# Patient Record
Sex: FEMALE | Race: WHITE | URBAN - METROPOLITAN AREA
[De-identification: names, ages, dates, MRNs, and addresses within clinical notes are randomized per-mention and may not be internally consistent; named-entity substitution may affect disease eponyms.]

---

## 2018-03-06 VITALS
SYSTOLIC BLOOD PRESSURE: 143 MMHG | RESPIRATION RATE: 16 BRPM | TEMPERATURE: 96.6 F | WEIGHT: 143.3 LBS | DIASTOLIC BLOOD PRESSURE: 68 MMHG | OXYGEN SATURATION: 92 % | HEART RATE: 72 BPM

## 2018-03-06 PROBLEM — Z86.73 HX-TIA (TRANSIENT ISCHEMIC ATTACK): Status: ACTIVE | Noted: 2018-03-06

## 2018-03-06 PROBLEM — M54.40 BACK PAIN OF LUMBAR REGION WITH SCIATICA: Status: ACTIVE | Noted: 2018-02-21

## 2018-03-06 PROBLEM — E78.5 HYPERLIPEMIA: Status: ACTIVE | Noted: 2018-03-06

## 2018-03-06 PROBLEM — I10 HTN (HYPERTENSION): Status: ACTIVE | Noted: 2018-02-21

## 2018-03-06 PROBLEM — Z98.1 HX OF SPINAL FUSION: Status: ACTIVE | Noted: 2018-03-06

## 2018-03-06 PROBLEM — Z87.81 HX OF FRACTURE OF RIGHT HIP: Status: ACTIVE | Noted: 2017-05-26

## 2018-03-06 PROBLEM — G47.00 INSOMNIA: Status: ACTIVE | Noted: 2017-05-26

## 2018-03-06 PROBLEM — E03.9 HYPOTHYROID: Status: ACTIVE | Noted: 2018-03-06

## 2018-03-06 PROBLEM — I73.9 PAD (PERIPHERAL ARTERY DISEASE) (H): Status: ACTIVE | Noted: 2018-03-06

## 2018-03-06 RX ORDER — CALCIUM CARBONATE 500(1250)
TABLET,CHEWABLE ORAL
COMMUNITY
Start: 2018-03-05

## 2018-03-06 RX ORDER — ACETAMINOPHEN 500 MG
1000 TABLET ORAL 4 TIMES DAILY
COMMUNITY
Start: 2018-03-05

## 2018-03-06 RX ORDER — NIACIN 500 MG
1000 TABLET ORAL
COMMUNITY
Start: 2018-03-05

## 2018-03-06 RX ORDER — DOCUSATE SODIUM 100 MG/1
100 CAPSULE, LIQUID FILLED ORAL 2 TIMES DAILY
COMMUNITY
Start: 2018-03-05

## 2018-03-06 RX ORDER — DOCUSATE SODIUM 100 MG
CAPSULE ORAL
COMMUNITY

## 2018-03-06 RX ORDER — MAGNESIUM OXIDE/MAG AA CHELATE 300 MG
CAPSULE ORAL
COMMUNITY
End: 2018-03-28

## 2018-03-06 RX ORDER — AMOXICILLIN 250 MG
2 CAPSULE ORAL 2 TIMES DAILY PRN
COMMUNITY
Start: 2018-03-05

## 2018-03-06 RX ORDER — POLYETHYLENE GLYCOL 3350 17 G/17G
17 POWDER, FOR SOLUTION ORAL DAILY
COMMUNITY
Start: 2018-03-05

## 2018-03-06 NOTE — PROGRESS NOTES
"[facility]  PRIMARY CARE PROVIDER AND CLINIC:  Luisana Roy [address] / [place] [address]  Chief Complaint   Patient presents with     Clinic Care Coordination - Initial       HPI:    Evonne Puri is a 86 year old  (7/10/1931),admitted to the [facility] from [facility] .  Hospital stay 2/26/18  through 3/5/18.  Admitted to this facility for  rehab, medical management and nursing care.  HPI information obtained from: facility chart records, facility staff and patient report.   Patient with PMH of hypertension, PVD, hypothyroidism, hyperlipidemia, right ALMA DELIA (07), lumbar fusion (04) and bilateral vascular stents in her legs.     Current issues are:      Acute bilateral low back pain with bilateral sciatica  Patient very frustrated with inability to find root cause and therefore manage pain  3 weeks ago experienced sudden onset of pain at night.  Unable to have MRI due to uncertainty of what material her stents are made of  Has been to ED and hospitalized twice in past 2 weeks and hospitalized for increasing LBP with sciatica associated with some weakness.  CT Lumbar showed chronic DJD  Neurosurgery consulted, mild L5 weakness and Carpal tunnel felt nothing surgically to be done.  Has wrist braces  Had steroid injections into SI joints--does not feel that has helped much.  On scheduled Tylenol, Neurontin, robaxin, PRN oxycodone, hydroxyzine  Pain is never completely controlled.  Neurontin increased in hospital without drowsiness S/E.  Also has generalized back pain, including in shoulder area.  Exacerbated by extension, abduction and rotation of arms--feels \"electric zingers\" shooting down her arms with when reaches a certain point with bilateral UE. Unable to  items without difficulty and sometimes dropping them    Vitamin B12 deficiency  Has had tingling in hands and feet as well as tongue, mouth.  B12 low.  Started on B12 injections.  To " have weekly, but they are not included in TCU orders.    PAD (peripheral artery disease) (H)/PVD  Has had 2 stents placed in BLE  Numb hands and feet    Essential hypertension  Chronic, controlled with Benazepril 10 , metoprolol 50    Hx-TIA (transient ischemic attack)  On Plavix. Denies any residual    Hypothyroidism, unspecified type  Chronic, stable on levothryoxine 100 mcg    Hyperlipidemia, unspecified hyperlipidemia type  Chronic and stable  On Rosuvastatin 40mg, niacin, ASA 81mg    Constipation, unspecified constipation type  Was constipated in hospital (?2/2 narcotics), received laxatives, enema, then had diarrhea.  Now more constipated.  On stool softener and Miralax.  Does not want to add anything further at this time   has not had much of an appetite, due to pain. Has had a 14# weight loss. Prefers lactose free diet, but does take pudding and ice cream supplements.    Bilateral Carpal Tunnel Syndrome  Dx by neuro in hospital.  Has splints.  However, unsure of dx.    Patient does not trust conclusions of neurologist  Tinel's sign and Phalen's test negative for pain    Leukocytosis  Mentioned in history, trending up, may have indolent CLL          CODE STATUS/ADVANCE DIRECTIVES DISCUSSION:   DNR / DNI  Patient's living condition: lives alone    ALLERGIES:Morphine and Oxycodone-acetaminophen  Surgical History    Surgery Date Laterality Comments   LUMBAR FUSION 2009       REPLACEMENT TOTAL HIP W/ RESURFACING IMPLANTS 2009 Right     CATARACT EXTRACTION   Bilateral     MOHS SURGERY   Left BCC   Medical History    Medical History Date Comments   History of Bell's palsy       Hx of fracture of foot 2017 Rt   Hypothyroid       PAD (peripheral artery disease) (HC)       Hx-TIA (transient ischemic attack)       Insomnia       Hx of smoking   30 yrs, quit 40 yr ago   Hx of spinal fusion       Hyperlipemia       Hx of basal cell carcinoma excision   Lt eye lid   Family History    Medical History Relation Name Comments    No Known Problems Father       No Known Problems Mother         Relation Name Status Comments   Father         Mother             SOCIAL HISTORY:  reports that she has quit smoking. She does not have any smokeless tobacco history on file.       Post Discharge Medication Reconciliation Status: discharge medications reconciled and changed, per note/orders (see AVS).  Current Outpatient Prescriptions   Medication Sig Dispense Refill     GABAPENTIN PO Take 200 mg by mouth daily And 100mg BID       Benazepril HCl (LOTENSIN PO) Take 10 mg by mouth daily       Magnesium 300 MG CAPS Give 900 mg by mouth one time a  day for Supplement       METOPROLOL SUCCINATE ER PO Take 50 mg by mouth daily       polyethylene glycol (MIRALAX/GLYCOLAX) powder Take 17 g by mouth daily       niacin 500 MG tablet Take 1,000 mg by mouth daily (with breakfast)       calcium carbonate 1250 (500 CA) MG CHEW Give 1 tablet by mouth one time a  day for Supplement       docusate sodium (COLACE) 100 MG capsule Take 100 mg by mouth 2 times daily       Carboxymethylcellulose Sod PF (REFRESH CELLUVISC) 1 % ophthalmic solution Place 1 drop into both eyes 2 times daily       VITAMIN E SKIN OIL Apply to Affected area on arms  topically two times a day for       acetaminophen (TYLENOL) 500 MG tablet Take 1,000 mg by mouth 4 times daily       HYDROXYZINE HCL PO Take 10 mg by mouth every 6 hours as needed for itching       OXYCODONE HCL PO Take 5 mg by mouth every 4 hours as needed       pramox-pe-glycerin-petrolatum (PREPARATION H) 1-0.25-14.4-15 % CREA cream Insert 1 application rectally as  needed for Hemorrhoids May have  4 times daily       Methocarbamol (ROBAXIN-750 PO) Take 750 mg by mouth every 6 hours as needed for muscle spasms       senna-docusate (SENOKOT-S;PERICOLACE) 8.6-50 MG per tablet Take 2 tablets by mouth 2 times daily as needed for constipation       levothyroxine (SYNTHROID) 100 MCG tablet Take 100 mcg by mouth daily       rosuvastatin  (CRESTOR) 40 MG tablet Take 40 mg by mouth daily       aspirin 81 MG tablet Take 81 mg by mouth daily       clopidogrel (PLAVIX) 75 MG tablet Take 75 mg by mouth daily         ROS:  10 point ROS of systems including Constitutional, Eyes, Respiratory, Cardiovascular, Gastroenterology, Genitourinary, Integumentary, Muscularskeletal, Psychiatric were all negative except for pertinent positives noted in my HPI.    Exam:  /68  Pulse 72  Temp 96.6  F (35.9  C)  Resp 16  Wt 143 lb 4.8 oz (65 kg)  SpO2 92%  GENERAL APPEARANCE:  Alert, oriented, anxious, cooperative  ENT:  Mouth and posterior oropharynx normal, moist mucous membranes  RESP:  lungs clear to auscultation , no respiratory distress  CV:  Palpation and auscultation of heart done , regular rate and rhythm, no murmur, rub, or gallop, peripheral edema trace+ in BLE  ABDOMEN:  normal bowel sounds, soft, nontender, no hepatosplenomegaly or other masses  M/S:   Gait and station abnormal w/c or walker with SBA due to numbness/tingling/uncertainty  SKIN:  no concerning lesions noted  NEURO:   DTRs WNL, strength equal, pain elicited with UE extension, abduction, Tinhel's and Phalen's negative  PSYCH:  oriented X 3, normal insight, judgement and memory, insight and judgement impaired, anxious  Unsure what baseline strength is    Lab/Diagnostic data:    CBC WITH AUTO DIFFERENTIAL (03/02/2018 9:26 AM)  Only the most recent of 3 results within the time period is included.    CBC WITH AUTO DIFFERENTIAL (03/02/2018 9:26 AM)   Component Value Ref Range   WHITE BLOOD COUNT  14.4 (H) 4.5 - 11.0 thou/cu mm   RED BLOOD COUNT  4.80 4.00 - 5.20 mil/cu mm   HEMOGLOBIN  15.5 12.0 - 16.0 g/dL   HEMATOCRIT  44.6 33.0 - 51.0 %   MCV  93 80 - 100 fL   MCH  32.3 26.0 - 34.0 pg   MCHC  34.8 32.0 - 36.0 g/dL   RDW  14.2 11.5 - 15.5 %   PLATELET COUNT  266 140 - 440 thou/cu mm   MPV  10.8 6.5 - 11.0 fL   NEUTROPHILS  74.4 (H) 42.0 - 72.0 %   LYMPHOCYTES  16.7 (L) 20.0 - 44.0 %    MONOCYTES  6.9 <12.0 %   EOSINOPHILS  0.9 <8.0 %   BASOPHILS  0.2 <3.0 %   IMMATURE GRANULOCYTES(METAS,MYELOS,PROS) 0.9 %   ABSOLUTE NEUTROPHILS  10.7 (H) 1.7 - 7.0 thou/cu mm   ABSOLUTE LYMPHOCYTES  2.4 0.9 - 2.9 thou/cu mm   ABSOLUTE MONOCYTES  1.0 (H) <0.9 thou/cu mm   ABSOLUTE EOSINOPHILS  0.1 <0.5 thou/cu mm   ABSOLUTE BASOPHILS  0.0 <0.3 thou/cu mm   ABSOLUTE IMMATURE GRANULOCYTES(METAS,MYELOS,PROS) 0.1 <0.3 thou/cu mm     CBC WITH AUTO DIFFERENTIAL (03/02/2018 9:26 AM)   Specimen Performing Laboratory   Blood [facility] LABORATORY    INTERNAL ZIP [id number]    [address] [place] [address]       BASIC METABOLIC PANEL (03/02/2018 9:26 AM)  Only the most recent of 3 results within the time period is included.    BASIC METABOLIC PANEL (03/02/2018 9:26 AM)   Component Value Ref Range   SODIUM 132 (L) 135 - 145 mmol/L   POTASSIUM 4.1 3.5 - 5.0 mmol/L   CHLORIDE 97 (L) 98 - 110 mmol/L   CO2,TOTAL 26 21 - 31 mmol/L   ANION GAP 9 5 - 18    GLUCOSE 164 (H) 65 - 100 mg/dL   CALCIUM 8.6 8.5 - 10.5 mg/dL   BUN 13 8 - 25 mg/dL   CREATININE 0.75 0.57 - 1.11 mg/dL   BUN/CREAT RATIO  17 10 - 20    GFR if African American >60 >60 ml/min/1.73m2   GFR if not African American >60 >60 ml/min/1.73m2     BASIC METABOLIC PANEL (03/02/2018 9:26 AM)   Specimen Performing Laboratory   Blood [facility] LABORATORY    INTERNAL ZIP [id number]    [address] [place] [address]     HEPATIC FUNCTION PANEL (02/28/2018 1:14 PM)  HEPATIC FUNCTION PANEL (02/28/2018 1:14 PM)   Component Value Ref Range   ALBUMIN 3.4 3.2 - 4.6 g/dL   PROTEIN,TOTAL 6.2 6.0 - 8.0 g/dL   GLOBULIN  2.8 2.0 - 3.7 g/dL   A/G RATIO 1.2 1.0 - 2.0    BILIRUBIN,TOTAL 0.6 0.2 - 1.2 mg/dL   BILIRUBIN,DIRECT 0.3 0.1 - 0.5 mg/dL   BILIRUBIN,INDIRECT  0.3 0.2 - 0.8 mg/dL   ALK PHOSPHATASE 69 50 - 136 IU/L   ALT (SGPT) 62 (H) 8 - 45 IU/L   AST (SGOT) 19 2 - 40 IU/L     HEPATIC FUNCTION PANEL (02/28/2018 1:14 PM)   Specimen Performing Laboratory    Blood [facility] LABORATORY    INTERNAL ZIP [id number]    [address] [place] [address]       UA - Urinalysis with Reflex Micro (02/26/2018 2:29 PM)  UA - Urinalysis with Reflex Micro (02/26/2018 2:29 PM)   Component Value Ref Range   COLOR  Yellow Yellow Color   CLARITY  Clear Clear Clarity   SPECIFIC GRAVITY,URINE  1.025 1.010, 1.015, 1.020, 1.025    PH,URINE  6.5 6.0, 7.0, 8.0, 5.5, 6.5, 7.5, 8.5    UROBILINOGEN,QUALITATIVE  Normal Normal EU/dl   PROTEIN, URINE Trace (A) Negative mg/dL   GLUCOSE, URINE Negative Negative mg/dL   KETONES,URINE  Negative Negative mg/dL   BILIRUBIN,URINE  Negative Negative    OCCULT BLOOD,URINE  Negative Negative    NITRITE  Negative Negative    LEUKOCYTE ESTERASE  Negative Negative      UA - Urinalysis with Reflex Micro (02/26/2018 2:29 PM)   Specimen Performing Laboratory   Urine [facility] LABORATORY    INTERNAL ZIP [id number]    [address] [place] [address]     Back to top of Results        ASSESSMENT/PLAN:  Acute bilateral low back pain with bilateral sciatica  Continue with current PO med regime  Rehab to start.  Consider ultrasound, heat, cold, ROM, splints  Will increase Neurontin   May need more work up    Vitamin B12 deficiency  Will add B12.  Has been 1 week since last injection    PAD (peripheral artery disease) (H)  Essential hypertension  Hx-TIA (transient ischemic attack)  Hypothyroidism, unspecified type  Hyperlipidemia, unspecified hyperlipidemia type  Continue with PTA meds.  Continue to monitor and adjust as necessary    Constipation, unspecified constipation type  Continue with current regime.  Monitor closely and adjust as necessary    Leukocytosis, unspecified type  Will need to f/u OP       Orders:  Give Vitamin B12 1,000 mcg injections weekly for 3 weeks then change to monthly  Increase Hydroxyzine frequency to every 4 hours; Encourage use with Oxycodone  Increase Gabapentin dose to 200mg in am, 100mg mid day, 300mg at  HS  Offer pudding and ice cream snacks daily    Total time spent with patient visit at the skilled nursing facility was >35 minutes including patient visit and review of past records. Greater than 50% of total time spent with counseling and coordinating care due to complex pain complaints, admission    Electronically signed by:  BERNARDINO Rosario CNP

## 2018-03-07 ENCOUNTER — NURSING HOME VISIT (OUTPATIENT)
Dept: GERIATRICS | Facility: CLINIC | Age: 83
End: 2018-03-07
Payer: COMMERCIAL

## 2018-03-07 DIAGNOSIS — I73.9 PAD (PERIPHERAL ARTERY DISEASE) (H): ICD-10-CM

## 2018-03-07 DIAGNOSIS — M54.41 ACUTE BILATERAL LOW BACK PAIN WITH BILATERAL SCIATICA: Primary | ICD-10-CM

## 2018-03-07 DIAGNOSIS — K59.00 CONSTIPATION, UNSPECIFIED CONSTIPATION TYPE: ICD-10-CM

## 2018-03-07 DIAGNOSIS — E53.8 VITAMIN B12 DEFICIENCY: ICD-10-CM

## 2018-03-07 DIAGNOSIS — I10 ESSENTIAL HYPERTENSION: ICD-10-CM

## 2018-03-07 DIAGNOSIS — G56.03 BILATERAL CARPAL TUNNEL SYNDROME: ICD-10-CM

## 2018-03-07 DIAGNOSIS — E03.9 HYPOTHYROIDISM, UNSPECIFIED TYPE: ICD-10-CM

## 2018-03-07 DIAGNOSIS — Z86.73 HX-TIA (TRANSIENT ISCHEMIC ATTACK): ICD-10-CM

## 2018-03-07 DIAGNOSIS — E78.5 HYPERLIPIDEMIA, UNSPECIFIED HYPERLIPIDEMIA TYPE: ICD-10-CM

## 2018-03-07 DIAGNOSIS — M54.42 ACUTE BILATERAL LOW BACK PAIN WITH BILATERAL SCIATICA: Primary | ICD-10-CM

## 2018-03-07 DIAGNOSIS — D72.829 LEUKOCYTOSIS, UNSPECIFIED TYPE: ICD-10-CM

## 2018-03-07 PROCEDURE — 99310 SBSQ NF CARE HIGH MDM 45: CPT | Performed by: NURSE PRACTITIONER

## 2018-03-12 VITALS
DIASTOLIC BLOOD PRESSURE: 90 MMHG | TEMPERATURE: 97.5 F | RESPIRATION RATE: 18 BRPM | SYSTOLIC BLOOD PRESSURE: 160 MMHG | WEIGHT: 146.4 LBS | HEART RATE: 99 BPM | OXYGEN SATURATION: 94 %

## 2018-03-12 NOTE — PROGRESS NOTES
[facility]  PRIMARY CARE PROVIDER AND CLINIC:  [facility] [address] [facility] / [facility] [address]  Chief Complaint   Patient presents with     Hospital F/U   HPI:    Evonne Puri is a 86 year old  (7/10/1931),admitted to the [facility] from [facility] .  Hospital stay 2/26/18  through 3/5/18.  Admitted to this rehab, medical management and nursing care.  HPI information obtained from: facility staff, patient report and [facility] chart review.      Past medical history significant for right hip replacement, lumbar fusion of bilateral vascular stenting in legs, with multiple admission to local hospital for weakness and LBP, CT lumber showed Ch DJD, NS consulted, no surgery noticed some mild L5 weakness but no surgery, also b/l CTS provided with braces; given B/L SIJ steroid injection, also seen by pain team, pain med adjusted. complained of  paresthesias - vit B12 came back low, started on supplementation started.     Patient was seen and examined today, reports that weeks prior to the initial admission to the hospital she had upper respiratory infection to antibiotic later developed as numbness and tingling in her feet hands and in the mouth, also started having worsening back pain.  Reports that the numbness tingling are still seen in her hands and feet and around the mouth.  Reports when she abducts her right shoulder she developed shooting pain down to the hand she feels that she is making some improvement with therapy as for a general.  Strength is concerned but no change in neuropathy.  Reportedly injection she had the hospital lower back did not change her numbness and tingling.  Reports that the therapist is working on the tight muscle on the right shoulder.  The lower back pains there but is better.  Major concern is the numbness and tingling    CODE STATUS/ADVANCE DIRECTIVES DISCUSSION:   DNR / DNI  Patient's living condition:  lives alone    ALLERGIES:Morphine and Oxycodone-acetaminophen  Surgical History    Surgery Date Laterality Comments   LUMBAR FUSION 2009         REPLACEMENT TOTAL HIP W/ RESURFACING IMPLANTS 2009 Right      CATARACT EXTRACTION    Bilateral      MOHS SURGERY    Left BCC   Medical History    Medical History Date Comments   History of Bell's palsy         Hx of fracture of foot 2017 Rt   Hypothyroid         PAD (peripheral artery disease) (HC)         Hx-TIA (transient ischemic attack)         Insomnia         Hx of smoking    30 yrs, quit 40 yr ago   Hx of spinal fusion         Hyperlipemia         Hx of basal cell carcinoma excision    Lt eye lid   Family History    Medical History Relation Name Comments   No Known Problems Father         No Known Problems Mother            Relation Name Status Comments   Father            Mother            SOCIAL HISTORY:  reports that she has quit smoking. She does not have any smokeless tobacco history on file.    Post Discharge Medication Reconciliation Status: discharge medications reconciled and changed, per note/orders (see AVS).  Current Outpatient Prescriptions   Medication Sig Dispense Refill     GABAPENTIN PO Take 200 mg by mouth daily And 100mg BID       Benazepril HCl (LOTENSIN PO) Take 10 mg by mouth daily       Magnesium 300 MG CAPS Give 900 mg by mouth one time a  day for Supplement       METOPROLOL SUCCINATE ER PO Take 50 mg by mouth daily       polyethylene glycol (MIRALAX/GLYCOLAX) powder Take 17 g by mouth daily       niacin 500 MG tablet Take 1,000 mg by mouth daily (with breakfast)       calcium carbonate 1250 (500 CA) MG CHEW Give 1 tablet by mouth one time a  day for Supplement       docusate sodium (COLACE) 100 MG capsule Take 100 mg by mouth 2 times daily       Carboxymethylcellulose Sod PF (REFRESH CELLUVISC) 1 % ophthalmic solution Place 1 drop into both eyes 2 times daily       VITAMIN E SKIN OIL Apply to Affected area on arms  topically two times a day  for       acetaminophen (TYLENOL) 500 MG tablet Take 1,000 mg by mouth 4 times daily       HYDROXYZINE HCL PO Take 10 mg by mouth every 6 hours as needed for itching       OXYCODONE HCL PO Take 5 mg by mouth every 4 hours as needed       pramox-pe-glycerin-petrolatum (PREPARATION H) 1-0.25-14.4-15 % CREA cream Insert 1 application rectally as  needed for Hemorrhoids May have  4 times daily       Methocarbamol (ROBAXIN-750 PO) Take 750 mg by mouth every 6 hours as needed for muscle spasms       senna-docusate (SENOKOT-S;PERICOLACE) 8.6-50 MG per tablet Take 2 tablets by mouth 2 times daily as needed for constipation       levothyroxine (SYNTHROID) 100 MCG tablet Take 100 mcg by mouth daily       rosuvastatin (CRESTOR) 40 MG tablet Take 40 mg by mouth daily       aspirin 81 MG tablet Take 81 mg by mouth daily       clopidogrel (PLAVIX) 75 MG tablet Take 75 mg by mouth daily         ROS:  10 point ROS of systems including Constitutional, Eyes, Respiratory, Cardiovascular, Gastroenterology, Genitourinary, Integumentary, Muscularskeletal, Psychiatric were all negative except for pertinent positives noted in my HPI.    Exam:  /90  Pulse 99  Temp 97.5  F (36.4  C)  Resp 18  Wt 146 lb 6.4 oz (66.4 kg)  SpO2 94%  GENERAL APPEARANCE:  in no distress, anxious  ENT:  Mouth and posterior oropharynx normal, moist mucous membranes  EYES:  EOM, conjunctivae, lids, pupils and irises normal  RESP:  respiratory effort and palpation of chest normal, lungs clear to auscultation , no respiratory distress  CV:  Palpation and auscultation of heart done , regular rate and rhythm, no murmur, rub, or gallop, no edema  ABDOMEN:  normal bowel sounds, soft, nontender, no hepatosplenomegaly or other masses  M/S:   Gait and station abnormal uses 4WW. lost some muscle mass in extremities. right shoulder ARM limited to 90 degree due to pain.  Right supraspinatus muscles and right trap  harder than left side.   SKIN:  thin and dry, dry  ecchymosis over hands and forearms healed surgical scar over lumbar area.   NEURO:   Cranial nerves 2-12 are normal tested and grossly at patient's baseline, DTR: diminished in ankle, knee, biceps and triceps. Ms strength: hand  4/5 b/l. Sensation to light touch diminished over hands (all territory) and fee (mainly over distal half of plantar surfaces)..Tinel test positive b/l. More on the left side.   PSYCH:  oriented X 3, affect and mood normal    Lab/Diagnostic data:    CBC WITH AUTO DIFFERENTIAL (03/02/2018 9:26 AM)  Only the most recent of 3 results within the time period is included.         CBC WITH AUTO DIFFERENTIAL (03/02/2018 9:26 AM)   Component Value Ref Range   WHITE BLOOD COUNT  14.4 (H) 4.5 - 11.0 thou/cu mm   RED BLOOD COUNT  4.80 4.00 - 5.20 mil/cu mm   HEMOGLOBIN  15.5 12.0 - 16.0 g/dL   HEMATOCRIT  44.6 33.0 - 51.0 %   MCV  93 80 - 100 fL   MCH  32.3 26.0 - 34.0 pg   MCHC  34.8 32.0 - 36.0 g/dL   RDW  14.2 11.5 - 15.5 %   PLATELET COUNT  266 140 - 440 thou/cu mm   MPV  10.8 6.5 - 11.0 fL   NEUTROPHILS  74.4 (H) 42.0 - 72.0 %   LYMPHOCYTES  16.7 (L) 20.0 - 44.0 %   MONOCYTES  6.9 <12.0 %   EOSINOPHILS  0.9 <8.0 %   BASOPHILS  0.2 <3.0 %   IMMATURE GRANULOCYTES(METAS,MYELOS,PROS) 0.9 %   ABSOLUTE NEUTROPHILS  10.7 (H) 1.7 - 7.0 thou/cu mm   ABSOLUTE LYMPHOCYTES  2.4 0.9 - 2.9 thou/cu mm   ABSOLUTE MONOCYTES  1.0 (H) <0.9 thou/cu mm   ABSOLUTE EOSINOPHILS  0.1 <0.5 thou/cu mm   ABSOLUTE BASOPHILS  0.0 <0.3 thou/cu mm   ABSOLUTE IMMATURE GRANULOCYTES(METAS,MYELOS,PROS) 0.1 <0.3 thou/cu mm          CBC WITH AUTO DIFFERENTIAL (03/02/2018 9:26 AM)   Specimen Performing Laboratory   Blood [facility] LABORATORY    INTERNAL ZIP [id number] [address] [facility] [place] [address]        BASIC METABOLIC PANEL (03/02/2018 9:26 AM)  Only the most recent of 3 results within the time period is included.         BASIC METABOLIC PANEL (03/02/2018 9:26 AM)   Component Value Ref Range   SODIUM 132 (L) 135  - 145 mmol/L   POTASSIUM 4.1 3.5 - 5.0 mmol/L   CHLORIDE 97 (L) 98 - 110 mmol/L   CO2,TOTAL 26 21 - 31 mmol/L   ANION GAP 9 5 - 18    GLUCOSE 164 (H) 65 - 100 mg/dL   CALCIUM 8.6 8.5 - 10.5 mg/dL   BUN 13 8 - 25 mg/dL   CREATININE 0.75 0.57 - 1.11 mg/dL   BUN/CREAT RATIO  17 10 - 20    GFR if African American >60 >60 ml/min/1.73m2   GFR if not African American >60 >60 ml/min/1.73m2          BASIC METABOLIC PANEL (03/02/2018 9:26 AM)   Specimen Performing Laboratory   Blood [facility] LABORATORY    INTERNAL ZIP [id number] [address] [place] [address]      HEPATIC FUNCTION PANEL (02/28/2018 1:14 PM)       HEPATIC FUNCTION PANEL (02/28/2018 1:14 PM)   Component Value Ref Range   ALBUMIN 3.4 3.2 - 4.6 g/dL   PROTEIN,TOTAL 6.2 6.0 - 8.0 g/dL   GLOBULIN  2.8 2.0 - 3.7 g/dL   A/G RATIO 1.2 1.0 - 2.0    BILIRUBIN,TOTAL 0.6 0.2 - 1.2 mg/dL   BILIRUBIN,DIRECT 0.3 0.1 - 0.5 mg/dL   BILIRUBIN,INDIRECT  0.3 0.2 - 0.8 mg/dL   ALK PHOSPHATASE 69 50 - 136 IU/L   ALT (SGPT) 62 (H) 8 - 45 IU/L   AST (SGOT) 19 2 - 40 IU/L          HEPATIC FUNCTION PANEL (02/28/2018 1:14 PM)   Specimen Performing Laboratory   Blood [facility] LABORATORY    INTERNAL ZIP [id number]    [address] [place] [address]        UA - Urinalysis with Reflex Micro (02/26/2018 2:29 PM)       UA - Urinalysis with Reflex Micro (02/26/2018 2:29 PM)   Component Value Ref Range   COLOR  Yellow Yellow Color   CLARITY  Clear Clear Clarity   SPECIFIC GRAVITY,URINE  1.025 1.010, 1.015, 1.020, 1.025    PH,URINE  6.5 6.0, 7.0, 8.0, 5.5, 6.5, 7.5, 8.5    UROBILINOGEN,QUALITATIVE  Normal Normal EU/dl   PROTEIN, URINE Trace (A) Negative mg/dL   GLUCOSE, URINE Negative Negative mg/dL   KETONES,URINE  Negative Negative mg/dL   BILIRUBIN,URINE  Negative Negative    OCCULT BLOOD,URINE  Negative Negative    NITRITE  Negative Negative    LEUKOCYTE ESTERASE  Negative Negative           UA - Urinalysis with Reflex Micro (02/26/2018 2:29 PM)   Specimen  Performing Laboratory   Urine [facility] LABORATORY    INTERNAL ZIP [id number] [address] [place] [address]           ASSESSMENT/PLAN:  Acute on chronic bilateral low back pain with bilateral sciatica:  - chronic DJD, diagnosed with b/l SIJ, had injections. On oxycodone, and methocarbamol.  Does not use Oxy as often out of fear to get addicted.   - started on therapy and making some progress as far strength is concerned.     Peripheral Neuropathy:  - affecting both hands and feet, relatively new and sudden in onset, noted after had abx for RTI.  Was found to have low B12 started on supplement. Given the short term of symptoms, will request EMG/NCS to define the type of the peripheral neuropathy.   - continue B12, and Neurontin.   - not improving, will increase Neurontin    Bilateral Carpal Tunnel Syndrome  - diagnosed by NS during hospitalization. May need an EMG/NSC to be done by neurology/PMR on outpatient basis if does not improve to diagnose the type of neuropathy    Chronic Issues:  ------------------------  - PAD (peripheral artery disease) (H)/PVD  - hx of 2 stents placed in BLE     Essential hypertension:  - Chronic, controlled with Benazepril 10 , metoprolol 50     Hx-TIA (transient ischemic attack)  On Plavix. No concern     Hypothyroidism, unspecified type  -   TSH   Date Value Ref Range Status   08/10/2016 0.87 0.40 - 4.00 mU/L Final   - in this age group keep TSH around 5  - Chronic, on levothyroxine 100 mcg. PCP may consider checking level once acute illness is subsided and manage accordingly.      Hyperlipidemia, unspecified hyperlipidemia type  - On Rosuvastatin 40mg, niacin, ASA 81mg    Orders:  1. Referral to neurology for EMG/NCS for peripheral neuropathy, new onset  2.  Increase Neurontin AM dose to 300 mg , Noon dose to 100mg and PM dose to 100mg  3.  Monitor for fall    Electronically signed by:  Hang Matute MD

## 2018-03-13 ENCOUNTER — NURSING HOME VISIT (OUTPATIENT)
Dept: GERIATRICS | Facility: CLINIC | Age: 83
End: 2018-03-13
Payer: COMMERCIAL

## 2018-03-13 DIAGNOSIS — I73.9 PAD (PERIPHERAL ARTERY DISEASE) (H): ICD-10-CM

## 2018-03-13 DIAGNOSIS — M54.41 ACUTE BILATERAL LOW BACK PAIN WITH BILATERAL SCIATICA: Primary | ICD-10-CM

## 2018-03-13 DIAGNOSIS — I10 ESSENTIAL HYPERTENSION: ICD-10-CM

## 2018-03-13 DIAGNOSIS — G61.82 MULTIFOCAL MOTOR NEUROPATHY (H): ICD-10-CM

## 2018-03-13 DIAGNOSIS — E03.9 HYPOTHYROIDISM, UNSPECIFIED TYPE: ICD-10-CM

## 2018-03-13 DIAGNOSIS — G56.03 BILATERAL CARPAL TUNNEL SYNDROME: ICD-10-CM

## 2018-03-13 DIAGNOSIS — M54.42 ACUTE BILATERAL LOW BACK PAIN WITH BILATERAL SCIATICA: Primary | ICD-10-CM

## 2018-03-13 DIAGNOSIS — E53.8 VITAMIN B12 DEFICIENCY: ICD-10-CM

## 2018-03-13 PROCEDURE — 99305 1ST NF CARE MODERATE MDM 35: CPT | Performed by: FAMILY MEDICINE

## 2018-03-13 NOTE — LETTER
3/13/2018        RE: Evonne Puri  [address]  [address]  [place] [address]        [facility] SERVICES  PRIMARY CARE PROVIDER AND CLINIC:  [facility] [address] [facility] [address]  Chief Complaint   Patient presents with     Hospital F/U   HPI:    Evonne Puri is a 86 year old  (7/10/1931),admitted to the [facility] from [facility] .  Hospital stay 2/26/18  through 3/5/18.  Admitted to this rehab, medical management and nursing care.  HPI information obtained from: facility staff, patient report and [facility] chart review.      Past medical history significant for right hip replacement, lumbar fusion of bilateral vascular stenting in legs, with multiple admission to local hospital for weakness and LBP, CT lumber showed Ch DJD, NS consulted, no surgery noticed some mild L5 weakness but no surgery, also b/l CTS provided with braces; given B/L SIJ steroid injection, also seen by pain team, pain med adjusted. complained of  paresthesias - vit B12 came back low, started on supplementation started.     Patient was seen and examined today, reports that weeks prior to the initial admission to the hospital she had upper respiratory infection to antibiotic later developed as numbness and tingling in her feet hands and in the mouth, also started having worsening back pain.  Reports that the numbness tingling are still seen in her hands and feet and around the mouth.  Reports when she abducts her right shoulder she developed shooting pain down to the hand she feels that she is making some improvement with therapy as for a general.  Strength is concerned but no change in neuropathy.  Reportedly injection she had the hospital lower back did not change her numbness and tingling.  Reports that the therapist is working on the tight muscle on the right shoulder.  The lower back pains there but is better.  Major concern is the  numbness and tingling    CODE STATUS/ADVANCE DIRECTIVES DISCUSSION:   DNR / DNI  Patient's living condition: lives alone    ALLERGIES:Morphine and Oxycodone-acetaminophen  Surgical History    Surgery Date Laterality Comments   LUMBAR FUSION 2009         REPLACEMENT TOTAL HIP W/ RESURFACING IMPLANTS 2009 Right      CATARACT EXTRACTION    Bilateral      MOHS SURGERY    Left BCC   Medical History    Medical History Date Comments   History of Bell's palsy         Hx of fracture of foot 2017 Rt   Hypothyroid         PAD (peripheral artery disease) (HC)         Hx-TIA (transient ischemic attack)         Insomnia         Hx of smoking    30 yrs, quit 40 yr ago   Hx of spinal fusion         Hyperlipemia         Hx of basal cell carcinoma excision    Lt eye lid   Family History    Medical History Relation Name Comments   No Known Problems Father         No Known Problems Mother            Relation Name Status Comments   Father            Mother            SOCIAL HISTORY:  reports that she has quit smoking. She does not have any smokeless tobacco history on file.    Post Discharge Medication Reconciliation Status: discharge medications reconciled and changed, per note/orders (see AVS).  Current Outpatient Prescriptions   Medication Sig Dispense Refill     GABAPENTIN PO Take 200 mg by mouth daily And 100mg BID       Benazepril HCl (LOTENSIN PO) Take 10 mg by mouth daily       Magnesium 300 MG CAPS Give 900 mg by mouth one time a  day for Supplement       METOPROLOL SUCCINATE ER PO Take 50 mg by mouth daily       polyethylene glycol (MIRALAX/GLYCOLAX) powder Take 17 g by mouth daily       niacin 500 MG tablet Take 1,000 mg by mouth daily (with breakfast)       calcium carbonate 1250 (500 CA) MG CHEW Give 1 tablet by mouth one time a  day for Supplement       docusate sodium (COLACE) 100 MG capsule Take 100 mg by mouth 2 times daily       Carboxymethylcellulose Sod PF (REFRESH CELLUVISC) 1 % ophthalmic solution Place 1 drop into  both eyes 2 times daily       VITAMIN E SKIN OIL Apply to Affected area on arms  topically two times a day for       acetaminophen (TYLENOL) 500 MG tablet Take 1,000 mg by mouth 4 times daily       HYDROXYZINE HCL PO Take 10 mg by mouth every 6 hours as needed for itching       OXYCODONE HCL PO Take 5 mg by mouth every 4 hours as needed       pramox-pe-glycerin-petrolatum (PREPARATION H) 1-0.25-14.4-15 % CREA cream Insert 1 application rectally as  needed for Hemorrhoids May have  4 times daily       Methocarbamol (ROBAXIN-750 PO) Take 750 mg by mouth every 6 hours as needed for muscle spasms       senna-docusate (SENOKOT-S;PERICOLACE) 8.6-50 MG per tablet Take 2 tablets by mouth 2 times daily as needed for constipation       levothyroxine (SYNTHROID) 100 MCG tablet Take 100 mcg by mouth daily       rosuvastatin (CRESTOR) 40 MG tablet Take 40 mg by mouth daily       aspirin 81 MG tablet Take 81 mg by mouth daily       clopidogrel (PLAVIX) 75 MG tablet Take 75 mg by mouth daily         ROS:  10 point ROS of systems including Constitutional, Eyes, Respiratory, Cardiovascular, Gastroenterology, Genitourinary, Integumentary, Muscularskeletal, Psychiatric were all negative except for pertinent positives noted in my HPI.    Exam:  /90  Pulse 99  Temp 97.5  F (36.4  C)  Resp 18  Wt 146 lb 6.4 oz (66.4 kg)  SpO2 94%  GENERAL APPEARANCE:  in no distress, anxious  ENT:  Mouth and posterior oropharynx normal, moist mucous membranes  EYES:  EOM, conjunctivae, lids, pupils and irises normal  RESP:  respiratory effort and palpation of chest normal, lungs clear to auscultation , no respiratory distress  CV:  Palpation and auscultation of heart done , regular rate and rhythm, no murmur, rub, or gallop, no edema  ABDOMEN:  normal bowel sounds, soft, nontender, no hepatosplenomegaly or other masses  M/S:   Gait and station abnormal uses 4WW. lost some muscle mass in extremities. right shoulder ARM limited to 90 degree due  to pain.  Right supraspinatus muscles and right trap  harder than left side.   SKIN:  thin and dry, dry ecchymosis over hands and forearms healed surgical scar over lumbar area.   NEURO:   Cranial nerves 2-12 are normal tested and grossly at patient's baseline, DTR: diminished in ankle, knee, biceps and triceps. Ms strength: hand  4/5 b/l. Sensation to light touch diminished over hands (all territory) and fee (mainly over distal half of plantar surfaces)..Tinel test positive b/l. More on the left side.   PSYCH:  oriented X 3, affect and mood normal    Lab/Diagnostic data:    CBC WITH AUTO DIFFERENTIAL (03/02/2018 9:26 AM)  Only the most recent of 3 results within the time period is included.         CBC WITH AUTO DIFFERENTIAL (03/02/2018 9:26 AM)   Component Value Ref Range   WHITE BLOOD COUNT  14.4 (H) 4.5 - 11.0 thou/cu mm   RED BLOOD COUNT  4.80 4.00 - 5.20 mil/cu mm   HEMOGLOBIN  15.5 12.0 - 16.0 g/dL   HEMATOCRIT  44.6 33.0 - 51.0 %   MCV  93 80 - 100 fL   MCH  32.3 26.0 - 34.0 pg   MCHC  34.8 32.0 - 36.0 g/dL   RDW  14.2 11.5 - 15.5 %   PLATELET COUNT  266 140 - 440 thou/cu mm   MPV  10.8 6.5 - 11.0 fL   NEUTROPHILS  74.4 (H) 42.0 - 72.0 %   LYMPHOCYTES  16.7 (L) 20.0 - 44.0 %   MONOCYTES  6.9 <12.0 %   EOSINOPHILS  0.9 <8.0 %   BASOPHILS  0.2 <3.0 %   IMMATURE GRANULOCYTES(METAS,MYELOS,PROS) 0.9 %   ABSOLUTE NEUTROPHILS  10.7 (H) 1.7 - 7.0 thou/cu mm   ABSOLUTE LYMPHOCYTES  2.4 0.9 - 2.9 thou/cu mm   ABSOLUTE MONOCYTES  1.0 (H) <0.9 thou/cu mm   ABSOLUTE EOSINOPHILS  0.1 <0.5 thou/cu mm   ABSOLUTE BASOPHILS  0.0 <0.3 thou/cu mm   ABSOLUTE IMMATURE GRANULOCYTES(METAS,MYELOS,PROS) 0.1 <0.3 thou/cu mm          CBC WITH AUTO DIFFERENTIAL (03/02/2018 9:26 AM)   Specimen Performing Laboratory   Blood [facility] LABORATORY    INTERNAL ZIP [id number]    [address] [address]        BASIC METABOLIC PANEL (03/02/2018 9:26 AM)  Only the most recent of 3 results within the time period is  included.         BASIC METABOLIC PANEL (03/02/2018 9:26 AM)   Component Value Ref Range   SODIUM 132 (L) 135 - 145 mmol/L   POTASSIUM 4.1 3.5 - 5.0 mmol/L   CHLORIDE 97 (L) 98 - 110 mmol/L   CO2,TOTAL 26 21 - 31 mmol/L   ANION GAP 9 5 - 18    GLUCOSE 164 (H) 65 - 100 mg/dL   CALCIUM 8.6 8.5 - 10.5 mg/dL   BUN 13 8 - 25 mg/dL   CREATININE 0.75 0.57 - 1.11 mg/dL   BUN/CREAT RATIO  17 10 - 20    GFR if African American >60 >60 ml/min/1.73m2   GFR if not African American >60 >60 ml/min/1.73m2          BASIC METABOLIC PANEL (03/02/2018 9:26 AM)   Specimen Performing Laboratory   Blood [facility] LABORATORY    INTERNAL ZIP [id number] [address] [place] [address]      HEPATIC FUNCTION PANEL (02/28/2018 1:14 PM)       HEPATIC FUNCTION PANEL (02/28/2018 1:14 PM)   Component Value Ref Range   ALBUMIN 3.4 3.2 - 4.6 g/dL   PROTEIN,TOTAL 6.2 6.0 - 8.0 g/dL   GLOBULIN  2.8 2.0 - 3.7 g/dL   A/G RATIO 1.2 1.0 - 2.0    BILIRUBIN,TOTAL 0.6 0.2 - 1.2 mg/dL   BILIRUBIN,DIRECT 0.3 0.1 - 0.5 mg/dL   BILIRUBIN,INDIRECT  0.3 0.2 - 0.8 mg/dL   ALK PHOSPHATASE 69 50 - 136 IU/L   ALT (SGPT) 62 (H) 8 - 45 IU/L   AST (SGOT) 19 2 - 40 IU/L          HEPATIC FUNCTION PANEL (02/28/2018 1:14 PM)   Specimen Performing Laboratory   Blood [facility] LABORATORY    INTERNAL ZIP [id number] [address] [place] [address]        UA - Urinalysis with Reflex Micro (02/26/2018 2:29 PM)       UA - Urinalysis with Reflex Micro (02/26/2018 2:29 PM)   Component Value Ref Range   COLOR  Yellow Yellow Color   CLARITY  Clear Clear Clarity   SPECIFIC GRAVITY,URINE  1.025 1.010, 1.015, 1.020, 1.025    PH,URINE  6.5 6.0, 7.0, 8.0, 5.5, 6.5, 7.5, 8.5    UROBILINOGEN,QUALITATIVE  Normal Normal EU/dl   PROTEIN, URINE Trace (A) Negative mg/dL   GLUCOSE, URINE Negative Negative mg/dL   KETONES,URINE  Negative Negative mg/dL   BILIRUBIN,URINE  Negative Negative    OCCULT BLOOD,URINE  Negative Negative    NITRITE  Negative Negative     LEUKOCYTE ESTERASE  Negative Negative           UA - Urinalysis with Reflex Micro (02/26/2018 2:29 PM)   Specimen Performing Laboratory   Urine [facility] LABORATORY    INTERNAL ZIP [id number] [address] [place] [facility] [place] [address]           ASSESSMENT/PLAN:  Acute on chronic bilateral low back pain with bilateral sciatica:  - chronic DJD, diagnosed with b/l SIJ, had injections. On oxycodone, and methocarbamol.  Does not use Oxy as often out of fear to get addicted.   - started on therapy and making some progress as far strength is concerned.     Peripheral Neuropathy:  - affecting both hands and feet, relatively new and sudden in onset, noted after had abx for RTI.  Was found to have low B12 started on supplement. Given the short term of symptoms, will request EMG/NCS to define the type of the peripheral neuropathy.   - continue B12, and Neurontin.   - not improving, will increase Neurontin    Bilateral Carpal Tunnel Syndrome  - diagnosed by NS during hospitalization. May need an EMG/NSC to be done by neurology/PMR on outpatient basis if does not improve to diagnose the type of neuropathy    Chronic Issues:  ------------------------  - PAD (peripheral artery disease) (H)/PVD  - hx of 2 stents placed in BLE     Essential hypertension:  - Chronic, controlled with Benazepril 10 , metoprolol 50     Hx-TIA (transient ischemic attack)  On Plavix. No concern     Hypothyroidism, unspecified type  -   TSH   Date Value Ref Range Status   08/10/2016 0.87 0.40 - 4.00 mU/L Final   - in this age group keep TSH around 5  - Chronic, on levothyroxine 100 mcg. PCP may consider checking level once acute illness is subsided and manage accordingly.      Hyperlipidemia, unspecified hyperlipidemia type  - On Rosuvastatin 40mg, niacin, ASA 81mg    Orders:  1. Referral to neurology for EMG/NCS for peripheral neuropathy, new onset  2.  Increase Neurontin AM dose to 300 mg , Noon dose to 100mg and PM dose to 100mg  3.  Monitor  for fall    Electronically signed by:  Hang Matute MD                    Sincerely,        Hang Matute MD

## 2018-03-28 VITALS
SYSTOLIC BLOOD PRESSURE: 181 MMHG | RESPIRATION RATE: 14 BRPM | HEART RATE: 84 BPM | DIASTOLIC BLOOD PRESSURE: 93 MMHG | WEIGHT: 147.4 LBS | TEMPERATURE: 97.2 F | OXYGEN SATURATION: 96 %

## 2018-03-28 NOTE — PROGRESS NOTES
[facility] DISCHARGE SUMMARY    PATIENT'S NAME: Evonne Puri  YOB: 1931  MEDICAL RECORD NUMBER:  [id number]    PRIMARY CARE PROVIDER AND CLINIC RESPONSIBLE AFTER TRANSFER: [facility] [address] [facility] [address]     CODE STATUS/ADVANCE DIRECTIVES DISCUSSION:   DNR / DNI       Allergies   Allergen Reactions     Morphine Nausea and Vomiting     Oxycodone-Acetaminophen Nausea and Vomiting       TRANSFERRING PROVIDERS: BERNARDINO Mancera CNP, Dr. Giovani MD  DATE OF SNF ADMISSION:  March / 05 / 2018  DATE OF SNF (anticipated) DISCHARGE: March / 31 / 2018  DISCHARGE DISPOSITION: Home with home care   Nursing Facility: [facility]  stay 2/26/18  to 3/5/18.     Condition on Discharge:  Stable.  Function:  Independent with walker  Cognitive Scores: BIMS 15     Equipment: walker    DISCHARGE DIAGNOSIS:   1. Multifocal motor neuropathy (H), acute    2. Spinal cord compression (H), C6-C7 via CT 27 March 18    3. Acute pain    4. Acute bilateral low back pain with bilateral sciatica    5. Bilateral carpal tunnel syndrome    6. Vitamin B12 deficiency    7. Essential hypertension    8. Hx-TIA (transient ischemic attack)    9. PAD (peripheral artery disease) (H), h/o Bilat LE stents    10. Debility        HPI Nursing Facility Course:  HPI information obtained from: facility chart records, facility staff, patient report and [facility] chart review.    Mrs. Puri has a complex acute HPI in that up to 6 weeks ago she only endorsed mild 2/10 bilateral hand neuropathy in 3 of her fingers both sides.  Reports one night she woke up spontaneously with significant LE pain and has been noting new/bad LE neuropathic pain, low back pain, R>L weakness in LE's and also worse bilateral arm neuropathy with unclear etiology, presented to hospital.     In hospital cervical CT noted mod-severe foraminal stenosis in same areas,  with high-grade spinal canal narrowing at C6-C7 and cord indentation at C6-C7 with osteophyte complex and impingement of Right C5 nerve root.  It was noted she saw NeuroSurg and had s/p SI injection for LE pain, which she reported did not help much.  Was not able to get MRI imaging due to prior hardware.  Had her Gabapentin adjusted with some improvement in pain but not function.      She was transitioned to the TCU for out patient management and PT/OT.  She reports she has been in contact with her PCP whom is planning further workup and EMG's and further NeuroSurg follow up.  She has been doing well with PT/OT thus will DC home with home care.      Besides her ongoing acute all 4 extremity neuropathy she has no other complaints.      Spinal cord compression (H), C6-C7 via CT 27 March 18  Multifocal motor neuropathy (H), acute  Acute pain  Acute bilateral low back pain with bilateral sciatica  Bilateral carpal tunnel syndrome  Unfortunately Mrs. Puri is not known to me, but if she truly developed all for extremity neuropathy spontaneously with no trauma, I would suspect the etiology to be either central (brain) or high up on the spinal cord.  CT does show significant narrowing and a impingement from a osteophyte, thus query if this is the etiology but unclear.    -Continue with Gabapentin for now.   -PRN Oxycodone and Hydroxyzine as well for pain.   -She reports NeuroSurg f/u early next week, which we strongly encouraged her to attend.   -Was given hand braces for her carpal tunnel syndrome.     Vitamin B12 deficiency  Found to be B12 deficient and started on monthly B 12 injections.  Unclear how much it is contributing to global neuropathy.   -On going use/management left to PCP discretion.     Essential hypertension  Hx-TIA (transient ischemic attack)  PAD (peripheral artery disease) (H), h/o Bilat LE stents  Review of VS's show VSS and within acceptable range.   -Continues on ASA and Plavix.      Debility  -Discharge to home with home care PT.       PAST MEDICAL HISTORY:  has no past medical history on file.    DISCHARGE MEDICATIONS:  Current Outpatient Prescriptions   Medication Sig Dispense Refill     GABAPENTIN PO 300mg in AM, 200mg at Noon and 400mg in PM       Benazepril HCl (LOTENSIN PO) Take 10 mg by mouth daily       METOPROLOL SUCCINATE ER PO Take 50 mg by mouth daily       polyethylene glycol (MIRALAX/GLYCOLAX) powder Take 17 g by mouth daily       niacin 500 MG tablet Take 1,000 mg by mouth daily (with breakfast)       calcium carbonate 1250 (500 CA) MG CHEW Give 1 tablet by mouth one time a  day for Supplement       docusate sodium (COLACE) 100 MG capsule Take 100 mg by mouth 2 times daily       Carboxymethylcellulose Sod PF (REFRESH CELLUVISC) 1 % ophthalmic solution Place 1 drop into both eyes 2 times daily       VITAMIN E SKIN OIL Apply to Affected area on arms  topically two times a day for       acetaminophen (TYLENOL) 500 MG tablet Take 1,000 mg by mouth 4 times daily       HYDROXYZINE HCL PO Take 10 mg by mouth every 6 hours as needed for itching       OXYCODONE HCL PO Take 5 mg by mouth every 4 hours as needed       pramox-pe-glycerin-petrolatum (PREPARATION H) 1-0.25-14.4-15 % CREA cream Insert 1 application rectally as  needed for Hemorrhoids May have  4 times daily       Methocarbamol (ROBAXIN-750 PO) Take 750 mg by mouth every 6 hours as needed for muscle spasms       senna-docusate (SENOKOT-S;PERICOLACE) 8.6-50 MG per tablet Take 2 tablets by mouth 2 times daily as needed for constipation       levothyroxine (SYNTHROID) 100 MCG tablet Take 100 mcg by mouth daily       rosuvastatin (CRESTOR) 40 MG tablet Take 40 mg by mouth daily       aspirin 81 MG tablet Take 81 mg by mouth daily       clopidogrel (PLAVIX) 75 MG tablet Take 75 mg by mouth daily         MEDICATION CHANGES/RATIONALE:     Controlled medications sent with patient:   A script for Oxycodone medication for a two week  supply was given to patient at dischage to have them fill at their out patient pharmacy    ROS:    7 point ROS done including, light headedness/dizziness, fever/chills, pain, Resp, CV, GI, and  and is negative other than noted in HPI.    Physical Exam:   Vitals: BP (!) 181/93  Pulse 84  Temp 97.2  F (36.2  C)  Resp 14  Wt 147 lb 6.4 oz (66.9 kg)  SpO2 96%  BMI= There is no height or weight on file to calculate BMI.    GENERAL APPEARANCE:  Elderly female resting in bed, NAD, non-toxic.  ENT:  Mouth and oropharynx normal, moist mucous membranes.   RESP:  Lungs CTA.  Regular relaxed breathing effort.  No cough.   CV: S1/S2 no murmur or rubs.  Regular rhythm and rate.  No generalized edema.   ABDOMEN:   Protuberant, soft, non-tender with active bowel sounds.    EXTREMITIES:  No lower extremity edema, no calf tenderness.   PSYCH: Alert and orientated, pleasant and cooperative.     DISCHARGE PLAN:  Home with home care  Patient instructed to follow-up with:    -PCP in 7 days    -Neuro/NeuroSurgery as instructed     Current [facility] scheduled appointments:  Future Appointments  Date Time Provider Department Center   3/29/2018 3:30 PM Deacon Landis, BERNARDINO CNP [facility] [place] [facility]       MTM referral needed and placed by this provider: No    Pending labs: None    SNF labs   Office Visit on 08/10/2016   Component Date Value Ref Range Status     N-Terminal Pro Bnp 08/10/2016 1436* 0 - 450 pg/mL Final    Comment: Reference range shown and results flagged as abnormal are for the outpatient,   non acute settings. Establishing a baseline value for each individual patient   is useful for follow-up.  Suggested inpatient cut points for confirming diagnosis of CHF in an acute   setting are:   >450 pg/mL (age 18 to less than 50)   >900 pg/mL (age 50 to less than 75)   >1800 pg/mL (75 yrs and older)  An inpatient or emergency department NT-proPBNP <300 pg/mL effectively rules   out   acute CHF, with 99% negative predictive  value.       Cholesterol 08/10/2016 125  <200 mg/dL Final     Triglycerides 08/10/2016 203* <150 mg/dL Final    Comment: Borderline high:  150-199 mg/dl   High:             200-499 mg/dl   Very high:       >499 mg/dl   Non Fasting       HDL Cholesterol 08/10/2016 45* >49 mg/dL Final     LDL Cholesterol Calculated 08/10/2016 39  <100 mg/dL Final    Desirable:       <100 mg/dl     Non HDL Cholesterol 08/10/2016 80  <130 mg/dL Final     TSH 08/10/2016 0.87  0.40 - 4.00 mU/L Final     Sodium 08/10/2016 141  133 - 144 mmol/L Final     Potassium 08/10/2016 4.2  3.4 - 5.3 mmol/L Final     Chloride 08/10/2016 105  94 - 109 mmol/L Final     Carbon Dioxide 08/10/2016 30  20 - 32 mmol/L Final     Anion Gap 08/10/2016 6  3 - 14 mmol/L Final     Glucose 08/10/2016 92  70 - 99 mg/dL Final     Urea Nitrogen 08/10/2016 10  7 - 30 mg/dL Final     Creatinine 08/10/2016 0.73  0.52 - 1.04 mg/dL Final     GFR Estimate 08/10/2016 76  >60 mL/min/1.7m2 Final    Non  GFR Calc     GFR Estimate If Black 08/10/2016   >60 mL/min/1.7m2 Final                    Value:>90   GFR Calc       Calcium 08/10/2016 8.9  8.5 - 10.1 mg/dL Final     Bilirubin Total 08/10/2016 0.3  0.2 - 1.3 mg/dL Final     Albumin 08/10/2016 4.0  3.4 - 5.0 g/dL Final     Protein Total 08/10/2016 7.9  6.8 - 8.8 g/dL Final     Alkaline Phosphatase 08/10/2016 72  40 - 150 U/L Final     ALT 08/10/2016 21  0 - 50 U/L Final     AST 08/10/2016 16  0 - 45 U/L Final       Discharge Treatments: None    TOTAL DISCHARGE TIME:   Greater than 30 minutes  Electronically signed by:  BERNARDINO Mancera CNP                 Documentation of Face-to-Face and Certification for Home Health Services     Patient: Evonne Puri   YOB: 1931  MR Number: [id number]  Today's Date: 3/29/2018    I certify that patient: Evonne Puri is under my care and that I, or a nurse practitioner or physician's assistant working with me, had a  face-to-face encounter that meets the physician face-to-face encounter requirements with this patient on: 29 March 18.    This encounter with the patient was in whole, or in part, for the following medical condition, which is the primary reason for home health care: Acute global neuropathy and debility.    I certify that, based on my findings, the following services are medically necessary home health services: Nursing and Physical Therapy.    My clinical findings support the need for the above services because: Nurse/PT/HHA is needed: To provide assessment and oversight required in the home to assure adherence to the medical plan due to: Acute global neuropathy and debility..    Further, I certify that my clinical findings support that this patient is homebound (i.e. absences from home require considerable and taxing effort and are for medical reasons or Confucianism services or infrequently or of short duration when for other reasons) because: Requires assistance of another person or specialized equipment to access medical services because patient: Requires supervision of another for safe transfer...    Based on the above findings. I certify that this patient is confined to the home and needs intermittent skilled nursing care, physical therapy and/or speech therapy.  The patient is under my care, and I have initiated the establishment of the plan of care.  This patient will be followed by a physician who will periodically review the plan of care.  Physician/Provider to provide follow up care: Pankaj Soto Medicare certified [place] Physician:   Electronically signed by  BERNARDINO Meade CNP  [facility]    Physician Signature: See electronic signature associated with these discharge orders.  Date: 3/29/2018

## 2018-03-29 ENCOUNTER — DISCHARGE SUMMARY NURSING HOME (OUTPATIENT)
Dept: GERIATRICS | Facility: CLINIC | Age: 83
End: 2018-03-29
Payer: COMMERCIAL

## 2018-03-29 DIAGNOSIS — Z86.73 HX-TIA (TRANSIENT ISCHEMIC ATTACK): ICD-10-CM

## 2018-03-29 DIAGNOSIS — I73.9 PAD (PERIPHERAL ARTERY DISEASE) (H): ICD-10-CM

## 2018-03-29 DIAGNOSIS — M54.42 ACUTE BILATERAL LOW BACK PAIN WITH BILATERAL SCIATICA: ICD-10-CM

## 2018-03-29 DIAGNOSIS — E53.8 VITAMIN B12 DEFICIENCY: ICD-10-CM

## 2018-03-29 DIAGNOSIS — G95.20 SPINAL CORD COMPRESSION (H): ICD-10-CM

## 2018-03-29 DIAGNOSIS — I10 ESSENTIAL HYPERTENSION: ICD-10-CM

## 2018-03-29 DIAGNOSIS — M54.41 ACUTE BILATERAL LOW BACK PAIN WITH BILATERAL SCIATICA: ICD-10-CM

## 2018-03-29 DIAGNOSIS — R52 ACUTE PAIN: ICD-10-CM

## 2018-03-29 DIAGNOSIS — G56.03 BILATERAL CARPAL TUNNEL SYNDROME: ICD-10-CM

## 2018-03-29 DIAGNOSIS — G61.82 MULTIFOCAL MOTOR NEUROPATHY (H): Primary | ICD-10-CM

## 2018-03-29 DIAGNOSIS — R53.81 DEBILITY: ICD-10-CM

## 2018-03-29 PROCEDURE — 99316 NF DSCHRG MGMT 30 MIN+: CPT | Performed by: NURSE PRACTITIONER

## 2018-03-29 NOTE — LETTER
3/29/2018        RE: Evonne Puri  [address]  [address]  [place] [address]          [place] [facility] DISCHARGE SUMMARY    PATIENT'S NAME: Evonne Puri  YOB: 1931  MEDICAL RECORD NUMBER:  [id number]    PRIMARY CARE PROVIDER AND CLINIC RESPONSIBLE AFTER TRANSFER: [facility] [address] [facility] [address]     CODE STATUS/ADVANCE DIRECTIVES DISCUSSION:   DNR / DNI       Allergies   Allergen Reactions     Morphine Nausea and Vomiting     Oxycodone-Acetaminophen Nausea and Vomiting       TRANSFERRING PROVIDERS: BERNARDINO Mancera CNP, Dr. Giovani MD  DATE OF SNF ADMISSION:  March / 05 / 2018  DATE OF SNF (anticipated) DISCHARGE: March / 31 / 2018  DISCHARGE DISPOSITION: Home with home care   Nursing Facility: [facility]  stay 2/26/18  to 3/5/18.     Condition on Discharge:  Stable.  Function:  Independent with walker  Cognitive Scores: BIMS 15     Equipment: walker    DISCHARGE DIAGNOSIS:   1. Multifocal motor neuropathy (H), acute    2. Spinal cord compression (H), C6-C7 via CT 27 March 18    3. Acute pain    4. Acute bilateral low back pain with bilateral sciatica    5. Bilateral carpal tunnel syndrome    6. Vitamin B12 deficiency    7. Essential hypertension    8. Hx-TIA (transient ischemic attack)    9. PAD (peripheral artery disease) (H), h/o Bilat LE stents    10. Debility        HPI Nursing Facility Course:  HPI information obtained from: facility chart records, facility staff, patient report and [facility] chart review.    Mrs. Puri has a complex acute HPI in that up to 6 weeks ago she only endorsed mild 2/10 bilateral hand neuropathy in 3 of her fingers both sides.  Reports one night she woke up spontaneously with significant LE pain and has been noting new/bad LE neuropathic pain, low back pain, R>L weakness in LE's and also worse bilateral arm neuropathy with unclear  etiology, presented to hospital.     In hospital cervical CT noted mod-severe foraminal stenosis in same areas, with high-grade spinal canal narrowing at C6-C7 and cord indentation at C6-C7 with osteophyte complex and impingement of Right C5 nerve root.  It was noted she saw NeuroSurg and had s/p SI injection for LE pain, which she reported did not help much.  Was not able to get MRI imaging due to prior hardware.  Had her Gabapentin adjusted with some improvement in pain but not function.      She was transitioned to the TCU for out patient management and PT/OT.  She reports she has been in contact with her PCP whom is planning further workup and EMG's and further NeuroSurg follow up.  She has been doing well with PT/OT thus will DC home with home care.      Besides her ongoing acute all 4 extremity neuropathy she has no other complaints.      Spinal cord compression (H), C6-C7 via CT 27 March 18  Multifocal motor neuropathy (H), acute  Acute pain  Acute bilateral low back pain with bilateral sciatica  Bilateral carpal tunnel syndrome  Unfortunately Mrs. Puri is not known to me, but if she truly developed all for extremity neuropathy spontaneously with no trauma, I would suspect the etiology to be either central (brain) or high up on the spinal cord.  CT does show significant narrowing and a impingement from a osteophyte, thus query if this is the etiology but unclear.    -Continue with Gabapentin for now.   -PRN Oxycodone and Hydroxyzine as well for pain.   -She reports NeuroSurg f/u early next week, which we strongly encouraged her to attend.   -Was given hand braces for her carpal tunnel syndrome.     Vitamin B12 deficiency  Found to be B12 deficient and started on monthly B 12 injections.  Unclear how much it is contributing to global neuropathy.   -On going use/management left to PCP discretion.     Essential hypertension  Hx-TIA (transient ischemic attack)  PAD (peripheral artery disease) (H), h/o Bilat  LE stents  Review of VS's show VSS and within acceptable range.   -Continues on ASA and Plavix.     Debility  -Discharge to home with home care PT.       PAST MEDICAL HISTORY:  has no past medical history on file.    DISCHARGE MEDICATIONS:  Current Outpatient Prescriptions   Medication Sig Dispense Refill     GABAPENTIN PO 300mg in AM, 200mg at Noon and 400mg in PM       Benazepril HCl (LOTENSIN PO) Take 10 mg by mouth daily       METOPROLOL SUCCINATE ER PO Take 50 mg by mouth daily       polyethylene glycol (MIRALAX/GLYCOLAX) powder Take 17 g by mouth daily       niacin 500 MG tablet Take 1,000 mg by mouth daily (with breakfast)       calcium carbonate 1250 (500 CA) MG CHEW Give 1 tablet by mouth one time a  day for Supplement       docusate sodium (COLACE) 100 MG capsule Take 100 mg by mouth 2 times daily       Carboxymethylcellulose Sod PF (REFRESH CELLUVISC) 1 % ophthalmic solution Place 1 drop into both eyes 2 times daily       VITAMIN E SKIN OIL Apply to Affected area on arms  topically two times a day for       acetaminophen (TYLENOL) 500 MG tablet Take 1,000 mg by mouth 4 times daily       HYDROXYZINE HCL PO Take 10 mg by mouth every 6 hours as needed for itching       OXYCODONE HCL PO Take 5 mg by mouth every 4 hours as needed       pramox-pe-glycerin-petrolatum (PREPARATION H) 1-0.25-14.4-15 % CREA cream Insert 1 application rectally as  needed for Hemorrhoids May have  4 times daily       Methocarbamol (ROBAXIN-750 PO) Take 750 mg by mouth every 6 hours as needed for muscle spasms       senna-docusate (SENOKOT-S;PERICOLACE) 8.6-50 MG per tablet Take 2 tablets by mouth 2 times daily as needed for constipation       levothyroxine (SYNTHROID) 100 MCG tablet Take 100 mcg by mouth daily       rosuvastatin (CRESTOR) 40 MG tablet Take 40 mg by mouth daily       aspirin 81 MG tablet Take 81 mg by mouth daily       clopidogrel (PLAVIX) 75 MG tablet Take 75 mg by mouth daily         MEDICATION CHANGES/RATIONALE:      Controlled medications sent with patient:   A script for Oxycodone medication for a two week supply was given to patient at dischage to have them fill at their out patient pharmacy    ROS:    7 point ROS done including, light headedness/dizziness, fever/chills, pain, Resp, CV, GI, and  and is negative other than noted in HPI.    Physical Exam:   Vitals: BP (!) 181/93  Pulse 84  Temp 97.2  F (36.2  C)  Resp 14  Wt 147 lb 6.4 oz (66.9 kg)  SpO2 96%  BMI= There is no height or weight on file to calculate BMI.    GENERAL APPEARANCE:  Elderly female resting in bed, NAD, non-toxic.  ENT:  Mouth and oropharynx normal, moist mucous membranes.   RESP:  Lungs CTA.  Regular relaxed breathing effort.  No cough.   CV: S1/S2 no murmur or rubs.  Regular rhythm and rate.  No generalized edema.   ABDOMEN:   Protuberant, soft, non-tender with active bowel sounds.    EXTREMITIES:  No lower extremity edema, no calf tenderness.   PSYCH: Alert and orientated, pleasant and cooperative.     DISCHARGE PLAN:  Home with home care  Patient instructed to follow-up with:    -PCP in 7 days    -Neuro/NeuroSurgery as instructed     Current [place] scheduled appointments:  Future Appointments  Date Time Provider Department Center   3/29/2018 3:30 PM Deacon Landis APRN CNP [facility] referral needed and placed by this provider: No    Pending labs: None    SNF labs   Office Visit on 08/10/2016   Component Date Value Ref Range Status     N-Terminal Pro Bnp 08/10/2016 1436* 0 - 450 pg/mL Final    Comment: Reference range shown and results flagged as abnormal are for the outpatient,   non acute settings. Establishing a baseline value for each individual patient   is useful for follow-up.  Suggested inpatient cut points for confirming diagnosis of CHF in an acute   setting are:   >450 pg/mL (age 18 to less than 50)   >900 pg/mL (age 50 to less than 75)   >1800 pg/mL (75 yrs and older)  An inpatient or emergency  department NT-proPBNP <300 pg/mL effectively rules   out   acute CHF, with 99% negative predictive value.       Cholesterol 08/10/2016 125  <200 mg/dL Final     Triglycerides 08/10/2016 203* <150 mg/dL Final    Comment: Borderline high:  150-199 mg/dl   High:             200-499 mg/dl   Very high:       >499 mg/dl   Non Fasting       HDL Cholesterol 08/10/2016 45* >49 mg/dL Final     LDL Cholesterol Calculated 08/10/2016 39  <100 mg/dL Final    Desirable:       <100 mg/dl     Non HDL Cholesterol 08/10/2016 80  <130 mg/dL Final     TSH 08/10/2016 0.87  0.40 - 4.00 mU/L Final     Sodium 08/10/2016 141  133 - 144 mmol/L Final     Potassium 08/10/2016 4.2  3.4 - 5.3 mmol/L Final     Chloride 08/10/2016 105  94 - 109 mmol/L Final     Carbon Dioxide 08/10/2016 30  20 - 32 mmol/L Final     Anion Gap 08/10/2016 6  3 - 14 mmol/L Final     Glucose 08/10/2016 92  70 - 99 mg/dL Final     Urea Nitrogen 08/10/2016 10  7 - 30 mg/dL Final     Creatinine 08/10/2016 0.73  0.52 - 1.04 mg/dL Final     GFR Estimate 08/10/2016 76  >60 mL/min/1.7m2 Final    Non  GFR Calc     GFR Estimate If Black 08/10/2016   >60 mL/min/1.7m2 Final                    Value:>90   GFR Calc       Calcium 08/10/2016 8.9  8.5 - 10.1 mg/dL Final     Bilirubin Total 08/10/2016 0.3  0.2 - 1.3 mg/dL Final     Albumin 08/10/2016 4.0  3.4 - 5.0 g/dL Final     Protein Total 08/10/2016 7.9  6.8 - 8.8 g/dL Final     Alkaline Phosphatase 08/10/2016 72  40 - 150 U/L Final     ALT 08/10/2016 21  0 - 50 U/L Final     AST 08/10/2016 16  0 - 45 U/L Final       Discharge Treatments: None    TOTAL DISCHARGE TIME:   Greater than 30 minutes  Electronically signed by:  BERNARDINO Mancera CNP                 Documentation of Face-to-Face and Certification for Home Health Services     Patient: Evonne Jimeneznikiashannan   YOB: 1931  MR Number: [id number]  Today's Date: 3/29/2018    I certify that patient: Evonne Puri is  under my care and that I, or a nurse practitioner or physician's assistant working with me, had a face-to-face encounter that meets the physician face-to-face encounter requirements with this patient on: 29 March 18.    This encounter with the patient was in whole, or in part, for the following medical condition, which is the primary reason for home health care: Acute global neuropathy and debility.    I certify that, based on my findings, the following services are medically necessary home health services: Nursing and Physical Therapy.    My clinical findings support the need for the above services because: Nurse/PT/HHA is needed: To provide assessment and oversight required in the home to assure adherence to the medical plan due to: Acute global neuropathy and debility..    Further, I certify that my clinical findings support that this patient is homebound (i.e. absences from home require considerable and taxing effort and are for medical reasons or [employer] services or infrequently or of short duration when for other reasons) because: Requires assistance of another person or specialized equipment to access medical services because patient: Requires supervision of another for safe transfer...    Based on the above findings. I certify that this patient is confined to the home and needs intermittent skilled nursing care, physical therapy and/or speech therapy.  The patient is under my care, and I have initiated the establishment of the plan of care.  This patient will be followed by a physician who will periodically review the plan of care.  Physician/Provider to provide follow up care: Pankaj Soto    Responsible Medicare certified [place] Physician:   Electronically signed by  BERNARDINO Meade CNP  [facility]    Physician Signature: See electronic signature associated with these discharge orders.  Date: 3/29/2018        Sincerely,        BERNARDINO Mancera CNP